# Patient Record
Sex: MALE | Race: WHITE | NOT HISPANIC OR LATINO | Employment: FULL TIME | URBAN - NONMETROPOLITAN AREA
[De-identification: names, ages, dates, MRNs, and addresses within clinical notes are randomized per-mention and may not be internally consistent; named-entity substitution may affect disease eponyms.]

---

## 2024-10-10 ENCOUNTER — HOSPITAL ENCOUNTER (OUTPATIENT)
Dept: CARDIOLOGY | Facility: HOSPITAL | Age: 34
Discharge: HOME | End: 2024-10-10
Payer: COMMERCIAL

## 2024-10-10 ENCOUNTER — HOSPITAL ENCOUNTER (EMERGENCY)
Facility: HOSPITAL | Age: 34
Discharge: HOME | End: 2024-10-10
Attending: EMERGENCY MEDICINE
Payer: COMMERCIAL

## 2024-10-10 ENCOUNTER — APPOINTMENT (OUTPATIENT)
Dept: RADIOLOGY | Facility: HOSPITAL | Age: 34
End: 2024-10-10
Payer: COMMERCIAL

## 2024-10-10 VITALS
HEIGHT: 62 IN | BODY MASS INDEX: 22.82 KG/M2 | OXYGEN SATURATION: 99 % | WEIGHT: 124 LBS | DIASTOLIC BLOOD PRESSURE: 78 MMHG | TEMPERATURE: 98.1 F | HEART RATE: 81 BPM | SYSTOLIC BLOOD PRESSURE: 131 MMHG | RESPIRATION RATE: 16 BRPM

## 2024-10-10 DIAGNOSIS — R07.9 ACUTE CHEST PAIN: Primary | ICD-10-CM

## 2024-10-10 LAB
ALBUMIN SERPL BCP-MCNC: 4.4 G/DL (ref 3.4–5)
ALP SERPL-CCNC: 36 U/L (ref 33–120)
ALT SERPL W P-5'-P-CCNC: 16 U/L (ref 10–52)
ANION GAP SERPL CALC-SCNC: 10 MMOL/L (ref 10–20)
AST SERPL W P-5'-P-CCNC: 18 U/L (ref 9–39)
BILIRUB SERPL-MCNC: 0.4 MG/DL (ref 0–1.2)
BUN SERPL-MCNC: 21 MG/DL (ref 6–23)
CALCIUM SERPL-MCNC: 9.1 MG/DL (ref 8.6–10.3)
CARDIAC TROPONIN I PNL SERPL HS: <3 NG/L (ref 0–20)
CARDIAC TROPONIN I PNL SERPL HS: <3 NG/L (ref 0–20)
CHLORIDE SERPL-SCNC: 104 MMOL/L (ref 98–107)
CO2 SERPL-SCNC: 29 MMOL/L (ref 21–32)
CREAT SERPL-MCNC: 1.1 MG/DL (ref 0.5–1.3)
D DIMER PPP FEU-MCNC: <215 NG/ML FEU
EGFRCR SERPLBLD CKD-EPI 2021: 90 ML/MIN/1.73M*2
ERYTHROCYTE [DISTWIDTH] IN BLOOD BY AUTOMATED COUNT: 12.9 % (ref 11.5–14.5)
GLUCOSE SERPL-MCNC: 94 MG/DL (ref 74–99)
HCT VFR BLD AUTO: 40.3 % (ref 41–52)
HGB BLD-MCNC: 13.5 G/DL (ref 13.5–17.5)
MCH RBC QN AUTO: 29.9 PG (ref 26–34)
MCHC RBC AUTO-ENTMCNC: 33.5 G/DL (ref 32–36)
MCV RBC AUTO: 89 FL (ref 80–100)
NRBC BLD-RTO: 0 /100 WBCS (ref 0–0)
PLATELET # BLD AUTO: 183 X10*3/UL (ref 150–450)
POTASSIUM SERPL-SCNC: 4.2 MMOL/L (ref 3.5–5.3)
PROT SERPL-MCNC: 6.7 G/DL (ref 6.4–8.2)
RBC # BLD AUTO: 4.51 X10*6/UL (ref 4.5–5.9)
SODIUM SERPL-SCNC: 139 MMOL/L (ref 136–145)
WBC # BLD AUTO: 8 X10*3/UL (ref 4.4–11.3)

## 2024-10-10 PROCEDURE — 36415 COLL VENOUS BLD VENIPUNCTURE: CPT | Performed by: EMERGENCY MEDICINE

## 2024-10-10 PROCEDURE — 93005 ELECTROCARDIOGRAM TRACING: CPT

## 2024-10-10 PROCEDURE — 71275 CT ANGIOGRAPHY CHEST: CPT

## 2024-10-10 PROCEDURE — 71275 CT ANGIOGRAPHY CHEST: CPT | Mod: FOREIGN READ | Performed by: RADIOLOGY

## 2024-10-10 PROCEDURE — 80053 COMPREHEN METABOLIC PANEL: CPT | Performed by: EMERGENCY MEDICINE

## 2024-10-10 PROCEDURE — 2550000001 HC RX 255 CONTRASTS: Performed by: EMERGENCY MEDICINE

## 2024-10-10 PROCEDURE — 85027 COMPLETE CBC AUTOMATED: CPT | Performed by: EMERGENCY MEDICINE

## 2024-10-10 PROCEDURE — 85379 FIBRIN DEGRADATION QUANT: CPT | Performed by: EMERGENCY MEDICINE

## 2024-10-10 PROCEDURE — 84484 ASSAY OF TROPONIN QUANT: CPT | Performed by: EMERGENCY MEDICINE

## 2024-10-10 PROCEDURE — 99285 EMERGENCY DEPT VISIT HI MDM: CPT | Mod: 25

## 2024-10-10 RX ADMIN — IOHEXOL 75 ML: 350 INJECTION, SOLUTION INTRAVENOUS at 02:41

## 2024-10-10 ASSESSMENT — COLUMBIA-SUICIDE SEVERITY RATING SCALE - C-SSRS
6. HAVE YOU EVER DONE ANYTHING, STARTED TO DO ANYTHING, OR PREPARED TO DO ANYTHING TO END YOUR LIFE?: NO
2. HAVE YOU ACTUALLY HAD ANY THOUGHTS OF KILLING YOURSELF?: NO
1. IN THE PAST MONTH, HAVE YOU WISHED YOU WERE DEAD OR WISHED YOU COULD GO TO SLEEP AND NOT WAKE UP?: NO

## 2024-10-10 ASSESSMENT — PAIN DESCRIPTION - DESCRIPTORS: DESCRIPTORS: TIGHTNESS

## 2024-10-10 ASSESSMENT — PAIN SCALES - GENERAL: PAINLEVEL_OUTOF10: 6

## 2024-10-10 NOTE — ED TRIAGE NOTES
"BIB self from home for CP midsternal  \" tight\" non radiating intermittent onset at rest approx 1900 s/p drinking coffee. Denies any aggravating or alleviating factors. VSS   "

## 2024-10-10 NOTE — ED NOTES
discharge instructions reviewed, opportunity for questions given. Addressed medications to be taken and where to  . Pt and or family verbalized understanding of instructions, follow up care and indications to return to the ED.  Pt departs ED with steady gait & has papers and belongings in hand     Beatriz Hoffman RN  10/10/24 0559

## 2024-10-10 NOTE — ED PROVIDER NOTES
HPI   Chief Complaint   Patient presents with    Chest Pain       Patient is a 34-year-old male with history of IV drug use.  He had COVID within the last year or so and since then he has had recurrent left chest pain that goes into the arm.  He never feels short of breath nauseated or diaphoretic with it.  This evening it came on after a couple of cups of coffee he says.  Pain is sharp and goes into the arm but does not go into the back or neck.  Does not seem to be positional or related to the respiratory cycle.            Patient History   History reviewed. No pertinent past medical history.  History reviewed. No pertinent surgical history.  No family history on file.  Social History     Tobacco Use    Smoking status: Former     Types: Cigarettes    Smokeless tobacco: Not on file   Substance Use Topics    Alcohol use: Not Currently    Drug use: Not Currently     Comment: on suboxone x 1 yr       Physical Exam   ED Triage Vitals [10/10/24 0117]   Temperature Heart Rate Respirations BP   36.7 °C (98.1 °F) 86 16 132/76      Pulse Ox Temp Source Heart Rate Source Patient Position   98 % Temporal Monitor Lying      BP Location FiO2 (%)     Right arm --       Physical Exam  Vitals and nursing note reviewed.   HENT:      Head: Normocephalic and atraumatic.      Right Ear: Tympanic membrane and ear canal normal.      Left Ear: Tympanic membrane and ear canal normal.      Nose: Nose normal.      Mouth/Throat:      Mouth: Mucous membranes are moist.   Cardiovascular:      Rate and Rhythm: Normal rate.   Pulmonary:      Effort: Pulmonary effort is normal.      Breath sounds: Normal breath sounds.   Abdominal:      General: Abdomen is flat.      Palpations: Abdomen is soft.   Musculoskeletal:         General: Normal range of motion.      Cervical back: Normal range of motion.   Skin:     General: Skin is warm and dry.   Neurological:      General: No focal deficit present.      Mental Status: He is alert.           ED Course  & MDM   Diagnoses as of 10/25/24 2159   Acute chest pain                 No data recorded     [place] Coma Scale Score: 15 (10/10/24 0148 : Beatriz Hoffman RN)                           Medical Decision Making  Send COVID this patient is a similar chest pain recurrences.  His workups have all been negative but at discharge tonight, I suggested that he would be best served by at least 1 outpatient follow-up with cardiology to get any kind of recommendations that might be pertinent regarding his recurrent pain.  I explained that it could be COVID-related but it was not due to something we are going to explain in an ER visit, but rather it would require to follow-up as an outpatient, more than likely.      Patient remained pain-free during the evaluation.  2 troponins were negative.  CT chest angio was negative for any PE or infiltrate.      EKG interpreted by me: Normal sinus rhythm with rate of 84.  Normal intervals and axes.        Procedure  Procedures     Travon Blackmon MD  10/10/24 0152       Travon Blackmon MD  10/12/24 1827       Travon Blackmon MD  10/13/24 1507       Travon Blackmon MD  10/13/24 1507       Travon Blackmon MD  10/13/24 1508       Travon Blackmon MD  10/16/24 0602       Travon Blackmon MD  10/25/24 2159

## 2024-10-12 LAB
ATRIAL RATE: 84 BPM
P AXIS: 71 DEGREES
P OFFSET: 215 MS
P ONSET: 155 MS
PR INTERVAL: 132 MS
Q ONSET: 221 MS
QRS COUNT: 14 BEATS
QRS DURATION: 98 MS
QT INTERVAL: 374 MS
QTC CALCULATION(BAZETT): 441 MS
QTC FREDERICIA: 418 MS
R AXIS: 59 DEGREES
T AXIS: 53 DEGREES
T OFFSET: 408 MS
VENTRICULAR RATE: 84 BPM

## 2024-12-24 ENCOUNTER — HOSPITAL ENCOUNTER (OUTPATIENT)
Dept: RADIOLOGY | Facility: HOSPITAL | Age: 34
Discharge: HOME | End: 2024-12-24
Payer: COMMERCIAL

## 2024-12-24 DIAGNOSIS — R07.89 OTHER CHEST PAIN: ICD-10-CM

## 2024-12-24 PROCEDURE — 75571 CT HRT W/O DYE W/CA TEST: CPT

## 2025-02-04 ENCOUNTER — APPOINTMENT (OUTPATIENT)
Dept: RADIOLOGY | Facility: HOSPITAL | Age: 35
End: 2025-02-04
Payer: COMMERCIAL

## 2025-02-04 ENCOUNTER — HOSPITAL ENCOUNTER (EMERGENCY)
Facility: HOSPITAL | Age: 35
Discharge: HOME | End: 2025-02-04
Attending: EMERGENCY MEDICINE
Payer: COMMERCIAL

## 2025-02-04 VITALS
HEIGHT: 63 IN | DIASTOLIC BLOOD PRESSURE: 71 MMHG | HEART RATE: 88 BPM | RESPIRATION RATE: 17 BRPM | SYSTOLIC BLOOD PRESSURE: 122 MMHG | TEMPERATURE: 97.4 F | OXYGEN SATURATION: 98 % | WEIGHT: 125 LBS | BODY MASS INDEX: 22.15 KG/M2

## 2025-02-04 DIAGNOSIS — R68.89 FLU-LIKE SYMPTOMS: ICD-10-CM

## 2025-02-04 DIAGNOSIS — J20.9 ACUTE BRONCHITIS, UNSPECIFIED ORGANISM: Primary | ICD-10-CM

## 2025-02-04 LAB
FLUAV RNA RESP QL NAA+PROBE: NOT DETECTED
FLUBV RNA RESP QL NAA+PROBE: NOT DETECTED
RSV RNA RESP QL NAA+PROBE: NOT DETECTED
SARS-COV-2 RNA RESP QL NAA+PROBE: NOT DETECTED

## 2025-02-04 PROCEDURE — 99284 EMERGENCY DEPT VISIT MOD MDM: CPT | Mod: 25 | Performed by: EMERGENCY MEDICINE

## 2025-02-04 PROCEDURE — 71046 X-RAY EXAM CHEST 2 VIEWS: CPT

## 2025-02-04 PROCEDURE — 87637 SARSCOV2&INF A&B&RSV AMP PRB: CPT | Performed by: EMERGENCY MEDICINE

## 2025-02-04 PROCEDURE — 71046 X-RAY EXAM CHEST 2 VIEWS: CPT | Mod: FOREIGN READ | Performed by: RADIOLOGY

## 2025-02-04 RX ORDER — DOXYCYCLINE 100 MG/1
100 TABLET ORAL 2 TIMES DAILY
Qty: 14 TABLET | Refills: 0 | Status: SHIPPED | OUTPATIENT
Start: 2025-02-04 | End: 2025-02-11

## 2025-02-04 RX ORDER — ALBUTEROL SULFATE 90 UG/1
2 INHALANT RESPIRATORY (INHALATION) EVERY 4 HOURS PRN
Qty: 18 G | Refills: 0 | Status: SHIPPED | OUTPATIENT
Start: 2025-02-04

## 2025-02-04 RX ORDER — BROMPHENIRAMINE MALEATE, PSEUDOEPHEDRINE HYDROCHLORIDE, AND DEXTROMETHORPHAN HYDROBROMIDE 2; 30; 10 MG/5ML; MG/5ML; MG/5ML
5 SYRUP ORAL 4 TIMES DAILY PRN
Qty: 120 ML | Refills: 0 | Status: SHIPPED | OUTPATIENT
Start: 2025-02-04 | End: 2025-02-14

## 2025-02-04 RX ORDER — METHYLPREDNISOLONE 4 MG/1
TABLET ORAL
Qty: 21 TABLET | Refills: 0 | Status: SHIPPED | OUTPATIENT
Start: 2025-02-04 | End: 2025-02-10

## 2025-02-04 ASSESSMENT — PAIN - FUNCTIONAL ASSESSMENT
PAIN_FUNCTIONAL_ASSESSMENT: 0-10
PAIN_FUNCTIONAL_ASSESSMENT: 0-10

## 2025-02-04 ASSESSMENT — PAIN SCALES - GENERAL
PAINLEVEL_OUTOF10: 0 - NO PAIN
PAINLEVEL_OUTOF10: 0 - NO PAIN

## 2025-02-04 NOTE — Clinical Note
Hira Monteiro was seen and treated in our emergency department on 2/4/2025.  He may return to work on 02/07/2025.       If you have any questions or concerns, please don't hesitate to call.      Gopal Amador, DO

## 2025-02-04 NOTE — ED PROVIDER NOTES
HPI   Chief Complaint   Patient presents with    URI       35-year-old male presents with flulike symptoms.  Patient states that for the past couple days he has had fever cough with yellow sputum production.  Also has bodyaches.      History provided by:  Patient and medical records          Patient History   No past medical history on file.  No past surgical history on file.  No family history on file.  Social History     Tobacco Use    Smoking status: Former     Types: Cigarettes    Smokeless tobacco: Not on file   Substance Use Topics    Alcohol use: Not Currently    Drug use: Not Currently     Comment: on suboxone x 1 yr       Physical Exam   ED Triage Vitals [02/04/25 1815]   Temperature Heart Rate Respirations BP   36.2 °C (97.2 °F) 85 17 123/71      SpO2 Temp src Heart Rate Source Patient Position   100 % -- -- --      BP Location FiO2 (%)     -- --       Physical Exam  Vitals and nursing note reviewed.   Constitutional:       General: He is not in acute distress.     Appearance: He is well-developed.   HENT:      Head: Normocephalic and atraumatic.      Nose: Congestion present.   Eyes:      Conjunctiva/sclera: Conjunctivae normal.   Cardiovascular:      Rate and Rhythm: Normal rate and regular rhythm.      Heart sounds: No murmur heard.  Pulmonary:      Effort: Pulmonary effort is normal. No respiratory distress.      Breath sounds: Wheezing present.   Abdominal:      Palpations: Abdomen is soft.      Tenderness: There is no abdominal tenderness.   Musculoskeletal:         General: No swelling.      Cervical back: Neck supple.   Skin:     General: Skin is warm and dry.      Capillary Refill: Capillary refill takes less than 2 seconds.   Neurological:      General: No focal deficit present.      Mental Status: He is alert and oriented to person, place, and time.      Sensory: No sensory deficit.      Motor: No weakness.      Coordination: Coordination normal.   Psychiatric:         Mood and Affect: Mood  normal.           ED Course & MDM   ED Course as of 02/04/25 1909   Tue Feb 04, 2025 1855 35-year-old male presents with flulike symptoms.  He has cough with yellow sputum production.  Also has bodyaches.  He is wheezing.  No hypoxia or respiratory distress.  COVID flu RSV.  2 view chest x-ray. [BT]   1903 Flu RSV COVID-negative [BT]   1903 XR chest 2 views  Chest x-ray preliminary read by myself negative for infiltrate or pneumothorax bilaterally. [BT]   1904 Productive cough with wheezing.  Placed on doxycycline for bronchitis.  Medrol pack, Bromfed-DM, albuterol inhaler.  PCP follow-up.  Return with worsening cough shortness of breath or other concerns.  He agrees with this plan verbalized understanding.  Discharged home. [BT]      ED Course User Index  [BT] Gopal Amador DO         Diagnoses as of 02/04/25 1909   Flu-like symptoms   Acute bronchitis, unspecified organism                 No data recorded     [place] Coma Scale Score: 15 (02/04/25 1858 : Araceli Bundy RN)                           Medical Decision Making      Procedure  Procedures     Gopal Amador DO  02/04/25 1904       Gopal Amador DO  02/04/25 1909

## 2025-03-20 ENCOUNTER — HOSPITAL ENCOUNTER (EMERGENCY)
Facility: HOSPITAL | Age: 35
Discharge: HOME | End: 2025-03-21
Attending: EMERGENCY MEDICINE
Payer: COMMERCIAL

## 2025-03-20 VITALS
WEIGHT: 128 LBS | HEIGHT: 63 IN | DIASTOLIC BLOOD PRESSURE: 86 MMHG | SYSTOLIC BLOOD PRESSURE: 155 MMHG | TEMPERATURE: 97.9 F | BODY MASS INDEX: 22.68 KG/M2 | OXYGEN SATURATION: 97 % | HEART RATE: 91 BPM | RESPIRATION RATE: 14 BRPM

## 2025-03-20 DIAGNOSIS — Z71.1 PHYSICALLY WELL BUT WORRIED: Primary | ICD-10-CM

## 2025-03-20 PROCEDURE — 99283 EMERGENCY DEPT VISIT LOW MDM: CPT

## 2025-03-20 ASSESSMENT — PAIN DESCRIPTION - PROGRESSION: CLINICAL_PROGRESSION: NOT CHANGED

## 2025-03-20 ASSESSMENT — PAIN SCALES - GENERAL
PAINLEVEL_OUTOF10: 1
PAINLEVEL_OUTOF10: 1

## 2025-03-20 ASSESSMENT — COLUMBIA-SUICIDE SEVERITY RATING SCALE - C-SSRS
2. HAVE YOU ACTUALLY HAD ANY THOUGHTS OF KILLING YOURSELF?: NO
1. IN THE PAST MONTH, HAVE YOU WISHED YOU WERE DEAD OR WISHED YOU COULD GO TO SLEEP AND NOT WAKE UP?: NO
6. HAVE YOU EVER DONE ANYTHING, STARTED TO DO ANYTHING, OR PREPARED TO DO ANYTHING TO END YOUR LIFE?: NO

## 2025-03-20 ASSESSMENT — PAIN - FUNCTIONAL ASSESSMENT: PAIN_FUNCTIONAL_ASSESSMENT: 0-10

## 2025-03-20 ASSESSMENT — PAIN DESCRIPTION - LOCATION: LOCATION: SCROTUM

## 2025-03-20 ASSESSMENT — PAIN DESCRIPTION - ORIENTATION: ORIENTATION: LEFT

## 2025-03-21 NOTE — ED TRIAGE NOTES
Pt walks in and states for the past 4 days he's noticed his L testicle appears red, swollen and has a lump on it. 1/10 pain.

## 2025-03-21 NOTE — DISCHARGE INSTRUCTIONS
There is no palpable mass in your scrotum at present time.  There is no tenderness.  You decided you would rather not wait for ultrasound.  I do not feel that this is necessary.  If you continue with any concerns follow-up with urology.  I have provided resources.

## 2025-03-21 NOTE — ED PROVIDER NOTES
HPI   Chief Complaint   Patient presents with    Groin Swelling       35-year-old male presents to the emergency department concerned that he has a lump in his left scrotum.  Patient states that he noticed a little bulge on his left testicle.  States that he did not initially sit on his right testicle but when he looked again he saw that it was exactly the same on his right testicle.  He still continued concern and decided to come and get checked.  Denies any pain.  Denies any penile drip..  Denies any discharge or any penile lesions.  No other complaints.              Patient History   History reviewed. No pertinent past medical history.  History reviewed. No pertinent surgical history.  No family history on file.  Social History     Tobacco Use    Smoking status: Every Day     Current packs/day: 1.00     Types: Cigarettes    Smokeless tobacco: Never   Vaping Use    Vaping status: Never Used   Substance Use Topics    Alcohol use: Not Currently    Drug use: Yes     Types: Marijuana     Comment: on suboxone x 1 yr       Physical Exam   ED Triage Vitals [03/20/25 2344]   Temperature Heart Rate Respirations BP   36.6 °C (97.9 °F) 91 14 155/86      SpO2 Temp Source Heart Rate Source Patient Position   97 % Temporal -- Sitting      BP Location FiO2 (%)     Left arm --       Physical Exam  Cardiovascular:      Rate and Rhythm: Normal rate and regular rhythm.   Pulmonary:      Effort: Pulmonary effort is normal.      Breath sounds: Normal breath sounds.   Genitourinary:     Comments: Genitourinary examination reveals testicles in good lie.  No penile lesions.  Testicles are nontender to palpation.  Both testicles were part palpated and there is no tenderness and no palpable masses.  You can feel the the head of the epididymitis as well as the tail of the epididymitis and they are nontender to palpation.  Musculoskeletal:         General: Normal range of motion.   Neurological:      Mental Status: He is alert.           ED  Course & MDM   Diagnoses as of 03/21/25 0003   Physically well but worried                 No data recorded                                 Medical Decision Making  Evaluation for testicular mass concern.  Evaluation reveals no evidence of any mass on examination.  There is no tenderness to palpation.  I feel that the patient is feeling and seeing the insertion of the head of the epididymitis.  There are no palpable lesions.  I still offered the ultrasound to the patient and explained to him that it would take between half an hour to an hour for ultrasound to get in to see them since they are coming from our [facility] facility.  The patient did not want to wait for that.  I do not feel that it is necessary for the patient to have an ultrasound tonight since he has no evidence or concerns for testicular torsion.  The patient will be discharged to follow-up with urology as needed and to return for any problems.  There is no concerns for any type of sexually transmitted diseases.  No need for testing.        Procedure  Procedures     Sanjay Dalton MD  03/21/25 0002       Sanjay Dalton MD  03/21/25 0003

## 2025-07-21 ENCOUNTER — HOSPITAL ENCOUNTER (EMERGENCY)
Facility: HOSPITAL | Age: 35
Discharge: HOME | End: 2025-07-21
Attending: EMERGENCY MEDICINE
Payer: COMMERCIAL

## 2025-07-21 VITALS
WEIGHT: 135 LBS | DIASTOLIC BLOOD PRESSURE: 81 MMHG | HEART RATE: 84 BPM | OXYGEN SATURATION: 98 % | SYSTOLIC BLOOD PRESSURE: 123 MMHG | TEMPERATURE: 97.9 F | RESPIRATION RATE: 16 BRPM | BODY MASS INDEX: 24.84 KG/M2 | HEIGHT: 62 IN

## 2025-07-21 DIAGNOSIS — S61.401A AVULSION OF SKIN OF RIGHT HAND, INITIAL ENCOUNTER: Primary | ICD-10-CM

## 2025-07-21 PROCEDURE — 90715 TDAP VACCINE 7 YRS/> IM: CPT | Performed by: EMERGENCY MEDICINE

## 2025-07-21 PROCEDURE — 2500000004 HC RX 250 GENERAL PHARMACY W/ HCPCS (ALT 636 FOR OP/ED): Performed by: EMERGENCY MEDICINE

## 2025-07-21 PROCEDURE — 99282 EMERGENCY DEPT VISIT SF MDM: CPT | Mod: 25 | Performed by: EMERGENCY MEDICINE

## 2025-07-21 PROCEDURE — 90471 IMMUNIZATION ADMIN: CPT | Performed by: EMERGENCY MEDICINE

## 2025-07-21 RX ADMIN — TETANUS TOXOID, REDUCED DIPHTHERIA TOXOID AND ACELLULAR PERTUSSIS VACCINE, ADSORBED 0.5 ML: 5; 2.5; 8; 8; 2.5 SUSPENSION INTRAMUSCULAR at 01:12

## 2025-07-21 ASSESSMENT — PAIN - FUNCTIONAL ASSESSMENT: PAIN_FUNCTIONAL_ASSESSMENT: 0-10

## 2025-07-21 ASSESSMENT — PAIN SCALES - GENERAL: PAINLEVEL_OUTOF10: 0 - NO PAIN

## 2025-07-21 NOTE — DISCHARGE INSTRUCTIONS
Protect the wound from dirt and injury.  It is okay to shower.    Avoid right hand use.  Must wear bandage and gloves at work.    The flap is very thin and cannot be repaired with stitches.  It will either stick down to the wound bed and begin to heal from the bottom.  Alternatively it may.    Return to the emergency department, red streaks going up your arm

## 2025-07-21 NOTE — Clinical Note
Juan M Rice was seen and treated in our emergency department on 7/21/2025.  He may return to work on 07/22/2025.  Limit use of right hand, especially for heavy weights, climbing, impact. Must wear bandage and protective glove at work until wound heals.     If you have any questions or concerns, please don't hesitate to call.      Iqra Bedolla MD

## 2025-07-21 NOTE — ED PROVIDER NOTES
HPI   Chief Complaint   Patient presents with   • Laceration     Right hand         History provided by:  Patient   used: No        This patient presents to the emergency department ambulatory via private vehicle for evaluation of laceration palmar aspect of right hand.  Patient states he was wiping steam off the mirror and his hand caught the edge of the mirror causing laceration palmar surface of his hand.  Bleeding controlled with local pressure.  The mirror did not break.  He denies any foreign body sensation.  No numbness or tingling.  He has been more than 5 years since his last tetanus immunization.    Patient History   Medical History[1]  Surgical History[2]  Family History[3]  Social History[4]    Physical Exam   ED Triage Vitals [07/21/25 0046]   Temperature Heart Rate Respirations BP   36.6 °C (97.9 °F) 84 16 123/81      SpO2 Temp src Heart Rate Source Patient Position   98 % -- -- --      BP Location FiO2 (%)     -- --       Physical Exam  Vitals reviewed.   Constitutional:       Appearance: Normal appearance.   HENT:      Head: Normocephalic and atraumatic.      Mouth/Throat:      Mouth: Mucous membranes are moist.     Eyes:      Pupils: Pupils are equal, round, and reactive to light.       Cardiovascular:      Rate and Rhythm: Normal rate and regular rhythm.      Pulses: Normal pulses.   Pulmonary:      Effort: Pulmonary effort is normal.     Musculoskeletal:         General: Tenderness present. Normal range of motion.      Cervical back: Normal range of motion.      Comments: No deformity or foreign body.  No active bleeding.  Tenderness in the area of the laceration on the.  Normal range of motion.     Skin:     General: Skin is warm and dry.      Capillary Refill: Capillary refill takes less than 2 seconds.      Comments: 1 x 1 cm superficial very thin edged laceration pointing distally palmar surface right hand at level of fifth MCP.     Neurological:      General: No focal  deficit present.      Mental Status: He is alert and oriented to person, place, and time.           ED Course & MDM   Diagnoses as of 07/21/25 0103   Avulsion of skin of right hand, initial encounter                 No data recorded     [place] Coma Scale Score: 15 (07/21/25 0047 : Whitney Tejeda, KRISTEN)                           Medical Decision Making  Patient presents emergency department with the above history and physical.  Tetanus was updated.  Shared medical decision making discussion with patient regarding management of the wound.  Patient understands the flap is too thin and friable to accept any stitches.  He declines Dermabond and would like to just use local wound care.  Wound care provided by nursing staff.  Wound care instructions provided.  Work note provided.    Results of exam and any testing were discussed with patient/family. To the best of my ability, I answered all questions. At this time, there is no indication for admission/transfer or further diagnostic testing. Patient understands to return for any new or worsening symptoms, or failure to improve as anticipated. The importance of follow-up was stressed.    Procedure  Procedures         [1]  History reviewed. No pertinent past medical history.  [2]  History reviewed. No pertinent surgical history.  [3]  No family history on file.  [4]  Social History  Tobacco Use   • Smoking status: Unknown   • Smokeless tobacco: Not on file   Substance Use Topics   • Alcohol use: Not on file   • Drug use: Not on file      Iqra Bedolla MD  07/21/25 0106